# Patient Record
Sex: MALE | Race: OTHER | URBAN - METROPOLITAN AREA
[De-identification: names, ages, dates, MRNs, and addresses within clinical notes are randomized per-mention and may not be internally consistent; named-entity substitution may affect disease eponyms.]

---

## 2024-10-01 ENCOUNTER — EMERGENCY (EMERGENCY)
Facility: HOSPITAL | Age: 29
LOS: 1 days | Discharge: HOME | End: 2024-10-01
Attending: EMERGENCY MEDICINE | Admitting: EMERGENCY MEDICINE
Payer: OTHER MISCELLANEOUS

## 2024-10-01 VITALS
HEART RATE: 77 BPM | TEMPERATURE: 98 F | DIASTOLIC BLOOD PRESSURE: 71 MMHG | SYSTOLIC BLOOD PRESSURE: 117 MMHG | OXYGEN SATURATION: 99 % | RESPIRATION RATE: 15 BRPM

## 2024-10-01 VITALS
HEART RATE: 81 BPM | TEMPERATURE: 98 F | SYSTOLIC BLOOD PRESSURE: 123 MMHG | OXYGEN SATURATION: 98 % | RESPIRATION RATE: 16 BRPM | DIASTOLIC BLOOD PRESSURE: 78 MMHG

## 2024-10-01 PROCEDURE — 73110 X-RAY EXAM OF WRIST: CPT | Mod: 26,RT

## 2024-10-01 PROCEDURE — 99284 EMERGENCY DEPT VISIT MOD MDM: CPT | Mod: 57

## 2024-10-01 PROCEDURE — 25600 CLTX DST RDL FX/EPHYS SEP WO: CPT | Mod: 54,RT

## 2024-10-01 PROCEDURE — 73130 X-RAY EXAM OF HAND: CPT | Mod: 26,RT

## 2024-10-01 NOTE — ED PROVIDER NOTE - NSFOLLOWUPINSTRUCTIONS_ED_ALL_ED_FT
Fracture    A fracture is a break in one of your bones. This can occur from a variety of injuries, especially traumatic ones. Symptoms include pain, bruising, or swelling. Do not use the injured limb. If a fracture is in one of the bones below your waist, do not put weight on that limb unless instructed to do so by your healthcare provider. Crutches or a cane may have been provided. A splint or cast may have been applied by your health care provider. Make sure to keep it dry and follow up with an orthopedist as instructed.    SEEK IMMEDIATE MEDICAL CARE IF YOU HAVE ANY OF THE FOLLOWING SYMPTOMS: numbness, tingling, increasing pain, or weakness in any part of the injured limb.    You have a small fracture of your distal radius.  You were placed in a splint.  This should be left in place until you see orthopedic surgery; You must follow up with orthopedics in the next 1-2 days.  Return sooner for increased pain, numbness, tingling of fingers.    The splint must remain on and dry;  you should cover it with a waterproof bag if you are showering

## 2024-10-01 NOTE — ED PROVIDER NOTE - OBJECTIVE STATEMENT
28 yo M, no PMH, here c/o right hand and wrist pain;  Pt is [employer] and when he was running after an individual, he tripped and fell;  He states initially he had right palm /hand pain, but now traveling up wrist.  No other injuries.  Denies head trauma,   Patient reports he is right handed  Incident occurred around 1pm per patient

## 2024-10-01 NOTE — ED PROVIDER NOTE - CLINICAL SUMMARY MEDICAL DECISION MAKING FREE TEXT BOX
28 yo M, [employer], chasing an individual, trip and fall, now with right hand/wrist pain  No other injuries  will obtain xray

## 2024-10-01 NOTE — ED PROVIDER NOTE - CARE PROVIDER_API CALL
Delmar Kimbrough  Orthopaedic Surgery  [address]  [place] [address]  Phone: [phone]  Fax: [phone]  Follow Up Time:

## 2024-10-01 NOTE — ED ADULT NURSE NOTE - OBJECTIVE STATEMENT
right wrist/hand pain s/p injuring himself at work, radial pulse palpable x 2, no s/s of distress, awaiting imaging

## 2024-10-01 NOTE — ED ADULT NURSE NOTE - MODE OF DISCHARGE
Patients wife called requesting a refill on the patients omeprazole. But she is wondering if his dose could be increased from 20 mg to 40 mg.    She states that the patient's acid reflux seems to be getting worse.    Please advise.  
Prescription called in  
Ambulatory

## 2024-10-01 NOTE — ED PROVIDER NOTE - ADDITIONAL NOTES AND INSTRUCTIONS:
Please follow up as directed by your employer, [employer]  You should not work until cleared by your physician.  You should have orthopedic follow up in next 1-2 days.

## 2024-10-01 NOTE — ED PROVIDER NOTE - PATIENT PORTAL LINK FT
You can access the FollowMyHealth Patient Portal offered by [facility] by registering at the following website: [email or web address]. By joining [facility]’s FollowMyHealth portal, you will also be able to view your health information using other applications (apps) compatible with our system.

## 2024-10-01 NOTE — ED PROVIDER NOTE - PHYSICAL EXAMINATION
CONSTITUTIONAL: Well-appearing; in no apparent distress.   HEAD: Normocephalic; atraumatic.   EYES: PERRL; EOM intact; conjunctiva and sclera clear;  ENT: normal nose; no rhinorrhea;   airway patent  NECK: Supple; non-tender; no stiffness  EXT: No cyanosis or edema; N/V intact:  RIGHT HAND:  2+ radial pulses, good grasp; 5/5 strength; No scaphoid tenderness; (+) bruising noted along right wrist.  SKIN: Normal for age and race; warm; dry;  NEURO: Alert and oriented; face symmetric; grossly unremarkable.   Sensation grossly intact; moving all extremities  PSYCHOLOGICAL: The patient’s mood and manner are appropriate.

## 2024-10-04 DIAGNOSIS — M79.641 PAIN IN RIGHT HAND: ICD-10-CM

## 2024-10-04 DIAGNOSIS — S52.501A UNSPECIFIED FRACTURE OF THE LOWER END OF RIGHT RADIUS, INITIAL ENCOUNTER FOR CLOSED FRACTURE: ICD-10-CM
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
## 2024-10-18 ENCOUNTER — NON-APPOINTMENT (OUTPATIENT)
Age: 29
End: 2024-10-18